# Patient Record
Sex: FEMALE | Race: WHITE | NOT HISPANIC OR LATINO
[De-identification: names, ages, dates, MRNs, and addresses within clinical notes are randomized per-mention and may not be internally consistent; named-entity substitution may affect disease eponyms.]

---

## 2017-02-08 ENCOUNTER — APPOINTMENT (OUTPATIENT)
Dept: ORTHOPEDIC SURGERY | Facility: CLINIC | Age: 67
End: 2017-02-08

## 2017-02-08 VITALS
DIASTOLIC BLOOD PRESSURE: 73 MMHG | WEIGHT: 250 LBS | HEIGHT: 68.5 IN | HEART RATE: 73 BPM | SYSTOLIC BLOOD PRESSURE: 119 MMHG | BODY MASS INDEX: 37.46 KG/M2

## 2017-02-08 DIAGNOSIS — M25.552 PAIN IN LEFT HIP: ICD-10-CM

## 2017-02-08 RX ORDER — GABAPENTIN 300 MG/1
300 CAPSULE ORAL
Qty: 30 | Refills: 2 | Status: DISCONTINUED | COMMUNITY
Start: 2017-02-08 | End: 2017-02-08

## 2017-02-08 RX ORDER — METHYLPREDNISOLONE 4 MG/1
4 TABLET ORAL
Qty: 21 | Refills: 0 | Status: DISCONTINUED | COMMUNITY
Start: 2017-02-08 | End: 2017-02-08

## 2017-02-12 ENCOUNTER — RESULT REVIEW (OUTPATIENT)
Age: 67
End: 2017-02-12

## 2017-02-15 PROBLEM — M25.552 LEFT HIP PAIN: Status: ACTIVE | Noted: 2017-02-15

## 2017-03-08 ENCOUNTER — APPOINTMENT (OUTPATIENT)
Dept: ORTHOPEDIC SURGERY | Facility: CLINIC | Age: 67
End: 2017-03-08

## 2017-03-08 VITALS
SYSTOLIC BLOOD PRESSURE: 132 MMHG | DIASTOLIC BLOOD PRESSURE: 84 MMHG | BODY MASS INDEX: 37.01 KG/M2 | HEART RATE: 77 BPM | WEIGHT: 247 LBS | HEIGHT: 68.5 IN

## 2017-03-08 RX ORDER — METHYLPREDNISOLONE 4 MG/1
4 TABLET ORAL
Qty: 21 | Refills: 0 | Status: DISCONTINUED | COMMUNITY
Start: 2017-02-08 | End: 2017-03-08

## 2017-03-10 NOTE — PHYSICAL EXAM
[Obese] : obese [Stooped] : stooped [Antalgic] : antalgic [Cane] : ambulates with cane [Limited] : is limited [Painful] : is painful [SLR] : positive straight leg raise [LE] : Sensory: Intact in bilateral lower extremities [0] : left ankle jerk 0 [DP] : dorsalis pedis 2+ and symmetric bilaterally [PT] : posterior tibial 2+ and symmetric bilaterally [Plantar Reflex Right Only] : absent on the right [Plantar Reflex Left Only] : absent on the left [DTR Reflexes Clonus Of Right Ankle (___ Beats)] : absent on the right [DTR Reflexes Clonus Of Left Ankle (___ Beats)] : absent on the left [FreeTextEntry2] : The pt is awake, alert and oriented to self, place and time, is uncomfortable but in no acute distress. Gait examination reveals a narrow based gait. Can heel and toe walk with difficulty Especially with toe walking on the left side. Inspection of neck, back and lower extremities bilaterally reveals no rashes or ecchymotic lesions.  There is no obvious abnormal spinal curvature in the sagittal and coronal planes. There is no tenderness over the cervical, thoracic spine, or the upper and lower extremities musculature. Left paraspinal as well as gluteal and sciatic notch tenderness noted. There is no sacroiliac tenderness. No greater trochanteric tenderness bilaterally. No atrophy or abnormal movements noted in the upper or lower extremities. There is no swelling noted in the upper or lower extremities bilaterally. No cervical lymphadenopathy noted anteriorly. No joint laxity noted in the upper and lower extremity joints bilaterally.\par  Hip range of motion is degrees internal rotation 30° external rotation without pain. Full range of motion of the shoulders bilaterally with no significant pain\par There is no groin pain with hip internal rotation and a negative ELIOT test bilaterally.  [de-identified] : flexion to her knees, extension 10 degrees with left calf pain [de-identified] : sits changing positions Because of discomfort [de-identified] : 4 views of the lumbar spine obtained today demonstrate vascular clips in the pelvis. The left-sided lumbar curve is noted. On the lateral projection spondylolisthesis at L4-5 and to a lesser extent L3-4. Between flexion extension no significant dynamic instability is noted. No obvious acute fractures identified. Minimal loss of disc height is also appreciated at the L5-S1 level. The disc heights appear preserved at the L3-4 and L4-5 levels.\par \par AP pelvis demonstrates vascular clips in the pelvis suggest a limb length discrepancy the left leg is shorter than the right. No significant changes in the hips bilaterally

## 2017-03-10 NOTE — CONSULT LETTER
[Dear  ___] : Dear  [unfilled], [I had the pleasure of evaluating your patient, [unfilled].] : I had the pleasure of evaluating your patient, [unfilled]. [FreeTextEntry2] : Hebert Poon [FreeTextEntry1] : Thank you for this referral. I have enclosed my note for your review. Please feel free to contact my office if you have additional questions regarding this patient.\par \par Regards,\par Krishan Ramirez MD, FACS, FAAOS\par \par  of Orthopaedic Surgery\par [facility] [employer]\par Spinal Reconstruction Surgery\par Minimally Invasive Spinal Surgery\par [facility]

## 2017-03-10 NOTE — HISTORY OF PRESENT ILLNESS
[Joint Pain] : joint pain [Joint Stiffness] : joint stiffness [Joint Swelling] : joint swelling [Muscle Aches] : muscle aches [Pain] : pain [Worsening] : worsening [10] : currently ~his/her~ pain is 10 out of 10 [Sitting] : sitting [Standing] : standing [Constant] : ~He/She~ states the symptoms seem to be constant [All Other ROS Normal] : All other review of systems are negative except as noted [All Hx] : past medical, family, and social [All] : medication and allergy [Bending] : worsened by bending [Lifting] : worsened by lifting [Prolonged Sitting] : worsened by prolonged sitting [Prolonged Standing] : worsened by prolonged standing [Walking] : worsened by walking [FreeTextEntry1] : Left posterior thigh pain WC DOI 2/7/16. 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  [FreeTextEntry2] : Pt presents here today for evaluation of left posterior thigh pain following work related injury 2/1/17 and 2/7/17. Pt states on 2/1/17 felt twinge of pain to left groin and left buttock following lifting a patient. Pt states pain progressively worsened and she followed up with PCP on 2/6/17 and GYN secondary to ovarian cancer in the past with hysterectomy for sarcoma.\par Pt states pain progressively worsened and was exacerbated on 2/7/17 when she was assisting another patient. Pain got worse from buttock to posterior knee. Pt seen in [facility] and was given injection of anti-inflammatory (toradal)  with no relief. Pt states also prescribed cyclobenzaprine which she is taking with no relief. Pt reports pain 10/10 in intensity and is constant, occurs to left groin, left buttock, and lateral left thigh.  [de-identified] : laying down [de-identified] : celebrex

## 2017-03-10 NOTE — DISCUSSION/SUMMARY
[Medication Risks Reviewed] : Medication risks reviewed [de-identified] : The patient today presents with symptoms suggestive and consistent with the diagnosis of lumbar radiculitis. She has spinal listhesis L3-4 and L4-5 as well as some loss of disc at L5-S1. Given her groin pain posterior thigh and buttock pain and L5, S1 radiculopathy cannot be excluded. I recommended an MRI lumbar spine for further evaluation. I also recommended reevaluation in 2 weeks. Note restricting her from work for 2 weeks which provided.Also recommended a lumbosacral orthosis for her.A trial of oral steroids and gabapentin was provided. I've instructed her to stop taking Celebrex until the steroids are completed.\par \par The patient was educated regarding their condition, treatment options as well as prescribed course of treatment. \par Risks and benefits as well as alternatives to the proposed treatment were also provided to the patient \par They were given the opportunity to have all their questions answered to their satisfaction.\par \par Vital signs were reviewed with the patient and the patient was instructed to followup with their primary care provider for further management.\par \par Healthy lifestyle recommendations were also made including a tobacco free lifestyle, proper diet, and weight control.

## 2017-03-26 ENCOUNTER — RESULT REVIEW (OUTPATIENT)
Age: 67
End: 2017-03-26

## 2017-04-03 ENCOUNTER — APPOINTMENT (OUTPATIENT)
Dept: ORTHOPEDIC SURGERY | Facility: CLINIC | Age: 67
End: 2017-04-03

## 2017-04-03 VITALS
BODY MASS INDEX: 38.65 KG/M2 | HEIGHT: 68.5 IN | SYSTOLIC BLOOD PRESSURE: 124 MMHG | HEART RATE: 90 BPM | WEIGHT: 258 LBS | DIASTOLIC BLOOD PRESSURE: 79 MMHG

## 2017-04-24 ENCOUNTER — APPOINTMENT (OUTPATIENT)
Dept: ORTHOPEDIC SURGERY | Facility: CLINIC | Age: 67
End: 2017-04-24

## 2017-04-24 VITALS
HEIGHT: 68.5 IN | WEIGHT: 258 LBS | DIASTOLIC BLOOD PRESSURE: 78 MMHG | BODY MASS INDEX: 38.65 KG/M2 | SYSTOLIC BLOOD PRESSURE: 116 MMHG | HEART RATE: 87 BPM

## 2017-04-27 ENCOUNTER — APPOINTMENT (OUTPATIENT)
Dept: MRI IMAGING | Facility: IMAGING CENTER | Age: 67
End: 2017-04-27

## 2017-04-27 ENCOUNTER — OUTPATIENT (OUTPATIENT)
Dept: OUTPATIENT SERVICES | Facility: HOSPITAL | Age: 67
LOS: 1 days | End: 2017-04-27
Payer: COMMERCIAL

## 2017-04-27 DIAGNOSIS — M43.16 SPONDYLOLISTHESIS, LUMBAR REGION: ICD-10-CM

## 2017-04-27 DIAGNOSIS — M54.16 RADICULOPATHY, LUMBAR REGION: ICD-10-CM

## 2017-04-27 DIAGNOSIS — M51.37 OTHER INTERVERTEBRAL DISC DEGENERATION, LUMBOSACRAL REGION: ICD-10-CM

## 2017-04-27 PROCEDURE — 72148 MRI LUMBAR SPINE W/O DYE: CPT

## 2017-05-01 ENCOUNTER — APPOINTMENT (OUTPATIENT)
Dept: ORTHOPEDIC SURGERY | Facility: CLINIC | Age: 67
End: 2017-05-01

## 2017-05-01 VITALS
HEART RATE: 87 BPM | HEIGHT: 68.5 IN | BODY MASS INDEX: 36.71 KG/M2 | DIASTOLIC BLOOD PRESSURE: 80 MMHG | WEIGHT: 245 LBS | SYSTOLIC BLOOD PRESSURE: 127 MMHG

## 2017-05-01 DIAGNOSIS — M71.38 OTHER BURSAL CYST, OTHER SITE: ICD-10-CM

## 2017-05-03 ENCOUNTER — APPOINTMENT (OUTPATIENT)
Dept: MRI IMAGING | Facility: CLINIC | Age: 67
End: 2017-05-03

## 2017-05-03 ENCOUNTER — RESULT REVIEW (OUTPATIENT)
Age: 67
End: 2017-05-03

## 2017-05-03 DIAGNOSIS — Z98.1 ARTHRODESIS STATUS: ICD-10-CM

## 2017-05-11 ENCOUNTER — CHART COPY (OUTPATIENT)
Age: 67
End: 2017-05-11

## 2017-05-15 ENCOUNTER — APPOINTMENT (OUTPATIENT)
Dept: ORTHOPEDIC SURGERY | Facility: CLINIC | Age: 67
End: 2017-05-15

## 2017-05-22 ENCOUNTER — APPOINTMENT (OUTPATIENT)
Dept: SPINE | Facility: CLINIC | Age: 67
End: 2017-05-22

## 2017-05-22 VITALS
DIASTOLIC BLOOD PRESSURE: 72 MMHG | SYSTOLIC BLOOD PRESSURE: 108 MMHG | HEIGHT: 68.5 IN | WEIGHT: 246 LBS | HEART RATE: 76 BPM | BODY MASS INDEX: 36.85 KG/M2

## 2017-05-22 DIAGNOSIS — M51.37 OTHER INTERVERTEBRAL DISC DEGENERATION, LUMBOSACRAL REGION: ICD-10-CM

## 2017-05-22 RX ORDER — AMOXICILLIN AND CLAVULANATE POTASSIUM 875; 125 MG/1; MG/1
875-125 TABLET, COATED ORAL
Qty: 14 | Refills: 0 | Status: COMPLETED | COMMUNITY
Start: 2017-02-24 | End: 2017-05-22

## 2017-05-22 RX ORDER — CYCLOBENZAPRINE HYDROCHLORIDE 10 MG/1
10 TABLET, FILM COATED ORAL
Qty: 10 | Refills: 0 | Status: COMPLETED | COMMUNITY
Start: 2017-02-07 | End: 2017-05-22

## 2017-05-22 RX ORDER — METOPROLOL SUCCINATE 50 MG/1
50 TABLET, EXTENDED RELEASE ORAL
Qty: 90 | Refills: 0 | Status: COMPLETED | COMMUNITY
Start: 2016-09-12 | End: 2017-05-22

## 2017-05-22 RX ORDER — RANOLAZINE 1000 MG/1
1000 TABLET, FILM COATED, EXTENDED RELEASE ORAL
Refills: 0 | Status: ACTIVE | COMMUNITY

## 2017-05-22 RX ORDER — GABAPENTIN 300 MG/1
300 CAPSULE ORAL
Qty: 30 | Refills: 2 | Status: COMPLETED | COMMUNITY
Start: 2017-02-08 | End: 2017-05-22

## 2017-05-22 RX ORDER — CLINDAMYCIN PHOSPHATE 20 MG/G
2 CREAM VAGINAL
Qty: 40 | Refills: 0 | Status: COMPLETED | COMMUNITY
Start: 2017-03-06 | End: 2017-05-22

## 2017-06-19 ENCOUNTER — APPOINTMENT (OUTPATIENT)
Dept: SPINE | Facility: CLINIC | Age: 67
End: 2017-06-19

## 2017-06-19 VITALS
WEIGHT: 247 LBS | DIASTOLIC BLOOD PRESSURE: 83 MMHG | HEART RATE: 71 BPM | BODY MASS INDEX: 37.44 KG/M2 | SYSTOLIC BLOOD PRESSURE: 132 MMHG | HEIGHT: 68 IN

## 2017-06-19 DIAGNOSIS — M51.26 OTHER INTERVERTEBRAL DISC DISPLACEMENT, LUMBAR REGION: ICD-10-CM

## 2017-07-03 ENCOUNTER — APPOINTMENT (OUTPATIENT)
Dept: ORTHOPEDIC SURGERY | Facility: CLINIC | Age: 67
End: 2017-07-03

## 2017-07-03 DIAGNOSIS — M67.442 GANGLION, LEFT HAND: ICD-10-CM

## 2017-07-03 DIAGNOSIS — S63.636D SPRAIN OF INTERPHALANGEAL JOINT OF RIGHT LITTLE FINGER, SUBSEQUENT ENCOUNTER: ICD-10-CM

## 2017-07-04 PROBLEM — S63.636D: Status: ACTIVE | Noted: 2017-07-03

## 2017-07-17 ENCOUNTER — APPOINTMENT (OUTPATIENT)
Dept: ORTHOPEDIC SURGERY | Facility: CLINIC | Age: 67
End: 2017-07-17

## 2017-07-17 ENCOUNTER — APPOINTMENT (OUTPATIENT)
Dept: SPINE | Facility: CLINIC | Age: 67
End: 2017-07-17

## 2017-07-17 VITALS
SYSTOLIC BLOOD PRESSURE: 133 MMHG | HEART RATE: 90 BPM | BODY MASS INDEX: 37.59 KG/M2 | WEIGHT: 248 LBS | DIASTOLIC BLOOD PRESSURE: 83 MMHG | HEIGHT: 68 IN

## 2017-07-17 DIAGNOSIS — M54.16 RADICULOPATHY, LUMBAR REGION: ICD-10-CM

## 2017-08-08 ENCOUNTER — APPOINTMENT (OUTPATIENT)
Dept: GASTROENTEROLOGY | Facility: CLINIC | Age: 67
End: 2017-08-08
Payer: COMMERCIAL

## 2017-08-08 PROCEDURE — 99244 OFF/OP CNSLTJ NEW/EST MOD 40: CPT | Mod: 25

## 2017-08-08 PROCEDURE — 36415 COLL VENOUS BLD VENIPUNCTURE: CPT

## 2017-08-08 PROCEDURE — 82274 ASSAY TEST FOR BLOOD FECAL: CPT | Mod: QW

## 2017-08-23 ENCOUNTER — RESULT REVIEW (OUTPATIENT)
Age: 67
End: 2017-08-23

## 2017-08-23 ENCOUNTER — APPOINTMENT (OUTPATIENT)
Dept: GASTROENTEROLOGY | Facility: CLINIC | Age: 67
End: 2017-08-23
Payer: COMMERCIAL

## 2017-08-23 PROCEDURE — 45380 COLONOSCOPY AND BIOPSY: CPT

## 2017-08-23 PROCEDURE — 43239 EGD BIOPSY SINGLE/MULTIPLE: CPT | Mod: 59

## 2017-09-07 ENCOUNTER — APPOINTMENT (OUTPATIENT)
Dept: GASTROENTEROLOGY | Facility: CLINIC | Age: 67
End: 2017-09-07
Payer: COMMERCIAL

## 2017-09-07 PROCEDURE — 99214 OFFICE O/P EST MOD 30 MIN: CPT

## 2017-10-23 ENCOUNTER — APPOINTMENT (OUTPATIENT)
Dept: ORTHOPEDIC SURGERY | Facility: CLINIC | Age: 67
End: 2017-10-23

## 2018-01-30 ENCOUNTER — APPOINTMENT (OUTPATIENT)
Dept: ORTHOPEDIC SURGERY | Facility: CLINIC | Age: 68
End: 2018-01-30
Payer: MEDICARE

## 2018-01-30 DIAGNOSIS — M19.042 PRIMARY OSTEOARTHRITIS, LEFT HAND: ICD-10-CM

## 2018-01-30 DIAGNOSIS — M65.351 TRIGGER FINGER, RIGHT LITTLE FINGER: ICD-10-CM

## 2018-01-30 PROCEDURE — 99214 OFFICE O/P EST MOD 30 MIN: CPT | Mod: 25

## 2018-01-30 PROCEDURE — 20550 NJX 1 TENDON SHEATH/LIGAMENT: CPT | Mod: RT

## 2018-03-14 ENCOUNTER — APPOINTMENT (OUTPATIENT)
Dept: ORTHOPEDIC SURGERY | Facility: CLINIC | Age: 68
End: 2018-03-14
Payer: MEDICARE

## 2018-03-14 VITALS
DIASTOLIC BLOOD PRESSURE: 77 MMHG | BODY MASS INDEX: 37.59 KG/M2 | SYSTOLIC BLOOD PRESSURE: 117 MMHG | WEIGHT: 248 LBS | HEIGHT: 68 IN | HEART RATE: 83 BPM

## 2018-03-14 PROCEDURE — 73502 X-RAY EXAM HIP UNI 2-3 VIEWS: CPT | Mod: RT

## 2018-03-14 PROCEDURE — 99214 OFFICE O/P EST MOD 30 MIN: CPT

## 2018-03-14 PROCEDURE — 72100 X-RAY EXAM L-S SPINE 2/3 VWS: CPT

## 2018-11-05 ENCOUNTER — APPOINTMENT (OUTPATIENT)
Dept: ORTHOPEDIC SURGERY | Facility: CLINIC | Age: 68
End: 2018-11-05
Payer: MEDICARE

## 2018-11-05 VITALS
HEART RATE: 74 BPM | BODY MASS INDEX: 38.49 KG/M2 | SYSTOLIC BLOOD PRESSURE: 135 MMHG | DIASTOLIC BLOOD PRESSURE: 86 MMHG | WEIGHT: 254 LBS | HEIGHT: 68 IN

## 2018-11-05 PROCEDURE — 99214 OFFICE O/P EST MOD 30 MIN: CPT

## 2018-11-05 PROCEDURE — 73502 X-RAY EXAM HIP UNI 2-3 VIEWS: CPT | Mod: RT

## 2018-11-05 RX ORDER — GABAPENTIN 300 MG/1
300 CAPSULE ORAL 3 TIMES DAILY
Qty: 90 | Refills: 2 | Status: DISCONTINUED | COMMUNITY
Start: 2017-05-01 | End: 2018-11-05

## 2018-11-26 ENCOUNTER — APPOINTMENT (OUTPATIENT)
Dept: ULTRASOUND IMAGING | Facility: CLINIC | Age: 68
End: 2018-11-26
Payer: MEDICARE

## 2018-11-26 ENCOUNTER — APPOINTMENT (OUTPATIENT)
Dept: MRI IMAGING | Facility: CLINIC | Age: 68
End: 2018-11-26
Payer: MEDICARE

## 2018-11-26 ENCOUNTER — OUTPATIENT (OUTPATIENT)
Dept: OUTPATIENT SERVICES | Facility: HOSPITAL | Age: 68
LOS: 1 days | End: 2018-11-26
Payer: MEDICARE

## 2018-11-26 DIAGNOSIS — Z00.8 ENCOUNTER FOR OTHER GENERAL EXAMINATION: ICD-10-CM

## 2018-11-26 PROCEDURE — 73721 MRI JNT OF LWR EXTRE W/O DYE: CPT | Mod: 26,RT

## 2018-11-26 PROCEDURE — 72148 MRI LUMBAR SPINE W/O DYE: CPT | Mod: 26

## 2018-11-26 PROCEDURE — 20611 DRAIN/INJ JOINT/BURSA W/US: CPT

## 2018-11-26 PROCEDURE — 20611 DRAIN/INJ JOINT/BURSA W/US: CPT | Mod: RT

## 2018-11-26 PROCEDURE — 73721 MRI JNT OF LWR EXTRE W/O DYE: CPT

## 2018-11-26 PROCEDURE — 72148 MRI LUMBAR SPINE W/O DYE: CPT

## 2018-12-04 ENCOUNTER — APPOINTMENT (OUTPATIENT)
Dept: ORTHOPEDIC SURGERY | Facility: CLINIC | Age: 68
End: 2018-12-04
Payer: MEDICARE

## 2018-12-04 VITALS
HEIGHT: 68 IN | HEART RATE: 73 BPM | BODY MASS INDEX: 38.49 KG/M2 | SYSTOLIC BLOOD PRESSURE: 114 MMHG | WEIGHT: 254 LBS | DIASTOLIC BLOOD PRESSURE: 61 MMHG

## 2018-12-04 DIAGNOSIS — M43.16 SPONDYLOLISTHESIS, LUMBAR REGION: ICD-10-CM

## 2018-12-04 DIAGNOSIS — M70.61 TROCHANTERIC BURSITIS, RIGHT HIP: ICD-10-CM

## 2018-12-04 PROCEDURE — 99214 OFFICE O/P EST MOD 30 MIN: CPT

## 2018-12-08 PROBLEM — M70.61 GREATER TROCHANTERIC BURSITIS OF RIGHT HIP: Status: ACTIVE | Noted: 2018-03-14

## 2018-12-08 PROBLEM — M43.16 SPONDYLOLISTHESIS OF LUMBAR REGION: Status: ACTIVE | Noted: 2017-02-08

## 2019-01-11 ENCOUNTER — OUTPATIENT (OUTPATIENT)
Dept: OUTPATIENT SERVICES | Facility: HOSPITAL | Age: 69
LOS: 1 days | Discharge: HOME | End: 2019-01-11

## 2019-01-11 ENCOUNTER — APPOINTMENT (OUTPATIENT)
Dept: SPEECH THERAPY | Facility: CLINIC | Age: 69
End: 2019-01-11

## 2019-01-24 DIAGNOSIS — R49.0 DYSPHONIA: ICD-10-CM

## 2019-01-25 ENCOUNTER — APPOINTMENT (OUTPATIENT)
Dept: SPEECH THERAPY | Facility: CLINIC | Age: 69
End: 2019-01-25

## 2019-02-06 ENCOUNTER — APPOINTMENT (OUTPATIENT)
Dept: SPEECH THERAPY | Facility: CLINIC | Age: 69
End: 2019-02-06

## 2019-02-12 ENCOUNTER — APPOINTMENT (OUTPATIENT)
Dept: ORTHOPEDIC SURGERY | Facility: CLINIC | Age: 69
End: 2019-02-12
Payer: MEDICARE

## 2019-02-12 VITALS — SYSTOLIC BLOOD PRESSURE: 106 MMHG | HEART RATE: 76 BPM | DIASTOLIC BLOOD PRESSURE: 70 MMHG

## 2019-02-12 DIAGNOSIS — M47.817 SPONDYLOSIS W/OUT MYELOPATHY OR RADICULOPATHY, LUMBOSACRAL REGION: ICD-10-CM

## 2019-02-12 DIAGNOSIS — M70.62 TROCHANTERIC BURSITIS, LEFT HIP: ICD-10-CM

## 2019-02-12 PROCEDURE — 99213 OFFICE O/P EST LOW 20 MIN: CPT

## 2019-02-15 ENCOUNTER — APPOINTMENT (OUTPATIENT)
Dept: ORTHOPEDIC SURGERY | Facility: CLINIC | Age: 69
End: 2019-02-15

## 2019-02-15 ENCOUNTER — APPOINTMENT (OUTPATIENT)
Dept: SPEECH THERAPY | Facility: CLINIC | Age: 69
End: 2019-02-15

## 2019-02-15 PROBLEM — M70.62 TROCHANTERIC BURSITIS OF LEFT HIP: Status: ACTIVE | Noted: 2017-04-24

## 2019-02-15 PROBLEM — M47.817 SPONDYLOSIS, LUMBOSACRAL: Status: ACTIVE | Noted: 2018-11-06

## 2019-02-15 NOTE — PHYSICAL EXAM
[ALL] : dorsalis pedis, posterior tibial, femoral, popliteal, and radial 2+ and symmetric bilaterally [Normal] : Oriented to person, place, and time, insight and judgement were intact and the affect was normal [UE/LE] : Sensory: Intact in bilateral upper & lower extremities [de-identified] : Constitutional: Well-nourished, well-developed, No acute distress\par Respiratory:  Good respiratory effort, no SOB\par Lymphatic: No regional lymphadenopathy, no lymphedema\par Psychiatric: Pleasant and normal affect, alert and oriented x3\par Skin: Clean dry and intact B/L UE/LE\par Musculoskeletal: normal except where as noted in regional exam\par \par Lumbar Spine Exam\par \par APPEARANCE: no marked deformities or malalignment, + loss of lordotic curvature of the lumbosacral spine. no marked deformities. + poor posture\par POSITIVE TENDERNESS: + IL/SI/ST ligs, + TTP of b/l SI joints, + b/l lower lumbar tenderness \par NONTENDER: no bony midline tenderness.\par ROM: Mildly limited in all directions, mildly painful at end range of flexion and extension\par SPECIAL TESTS: neg SLR b/l, neg ELIOT b/l\par PULSES: 2+ DP/PT pulses\par NEURO: \par decreased sensation lateral aspect bilateral thighs\par decreased sensation lateral aspect left leg and lateral aspect right foot\par + weakness right hip abduction (3- to 3/5), bilateral knee extension and flexion (3+ to 4-/5), and bilateral straight leg raise (3+ to 4-/5)\par \par Right Hip:\par \par APPEARANCE: no marked deformities, no swelling or malalignment\par POSITIVE TENDERNESS: residual Greater trochanter ttp, and mild distally along ITB\par NONTENDER: TFL, gluteal region, ischium/proximal hamstring region, hip flexor region, sartorius and pubic symphysis. \par ROM: full & painless. \par RESISTIVE TESTING: +pain, weakness in lateral hip with resisted abduction\par PULSES: 2+ DP/PT pulses\par \par  [Poor Appearance] : well-appearing [Acute Distress] : not in acute distress

## 2019-02-15 NOTE — HISTORY OF PRESENT ILLNESS
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 can reach up to 9/10 at times\par at end of last visit, advised patient to proceed with previously ordered EMG-NCV testing BLE\par still hasn't gone to have this testing done\par also advised to f/up with Dr Ramirez for further management of her underlying lumbar spine pathology/etiology\par hasn't yet seen Dr Ramirez for f/up \par also referred to see a hip subpecialist who performs hip arthroscopy (as patient not a candidate for ARIEL at this time)\par hasn't yet gone to see recommended hip arthroscopy specialist \par

## 2019-02-15 NOTE — DISCUSSION/SUMMARY
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 also with clinically resolving/improving right hip greater troch bursitis. MRI right hip + for underlying gluteal tendinopathy but negative advanced/significant hip abductor tears or arthropathy\par continue current nonop tx as already in place\par have yet again advised patient to proceed with previously ordered BLE EMG-NCV testing \par patient once again advised to f/up witH Dr Ramirez regarding her lumbar spine pathology (as I still believe that this is the main source of her symptoms/complaints/findings)\par otherwise patient once again advised to see a hip subspecialist who performs hip arthroscopy to see if she could be a candidate for that type of procedure to treat her posterolateral hip pain complaints (as she is not a candidate currently for ARIEL surgery)\par f/up with me prn

## 2019-02-21 ENCOUNTER — APPOINTMENT (OUTPATIENT)
Dept: ORTHOPEDIC SURGERY | Facility: CLINIC | Age: 69
End: 2019-02-21
Payer: MEDICARE

## 2019-02-21 VITALS
DIASTOLIC BLOOD PRESSURE: 81 MMHG | SYSTOLIC BLOOD PRESSURE: 133 MMHG | BODY MASS INDEX: 37.89 KG/M2 | HEART RATE: 98 BPM | WEIGHT: 250 LBS | HEIGHT: 68 IN

## 2019-02-21 DIAGNOSIS — M25.551 PAIN IN RIGHT HIP: ICD-10-CM

## 2019-02-21 DIAGNOSIS — G89.29 PAIN IN RIGHT HIP: ICD-10-CM

## 2019-02-21 DIAGNOSIS — M25.561 PAIN IN RIGHT KNEE: ICD-10-CM

## 2019-02-21 DIAGNOSIS — M25.461 EFFUSION, RIGHT KNEE: ICD-10-CM

## 2019-02-21 PROCEDURE — 99214 OFFICE O/P EST MOD 30 MIN: CPT | Mod: 25

## 2019-02-21 PROCEDURE — 20610 DRAIN/INJ JOINT/BURSA W/O US: CPT | Mod: RT

## 2019-03-05 ENCOUNTER — LABORATORY RESULT (OUTPATIENT)
Age: 69
End: 2019-03-05

## 2019-03-06 ENCOUNTER — APPOINTMENT (OUTPATIENT)
Dept: GASTROENTEROLOGY | Facility: CLINIC | Age: 69
End: 2019-03-06
Payer: MEDICARE

## 2019-03-06 VITALS
SYSTOLIC BLOOD PRESSURE: 133 MMHG | WEIGHT: 250 LBS | HEART RATE: 81 BPM | DIASTOLIC BLOOD PRESSURE: 94 MMHG | BODY MASS INDEX: 37.46 KG/M2 | HEIGHT: 68.5 IN

## 2019-03-06 DIAGNOSIS — K21.9 GASTRO-ESOPHAGEAL REFLUX DISEASE W/OUT ESOPHAGITIS: ICD-10-CM

## 2019-03-06 DIAGNOSIS — E06.3 AUTOIMMUNE THYROIDITIS: ICD-10-CM

## 2019-03-06 DIAGNOSIS — E78.5 HYPERLIPIDEMIA, UNSPECIFIED: ICD-10-CM

## 2019-03-06 DIAGNOSIS — Z83.71 FAMILY HISTORY OF COLONIC POLYPS: ICD-10-CM

## 2019-03-06 DIAGNOSIS — E66.9 OBESITY, UNSPECIFIED: ICD-10-CM

## 2019-03-06 DIAGNOSIS — K44.9 DIAPHRAGMATIC HERNIA W/OUT OBSTRUCTION OR GANGRENE: ICD-10-CM

## 2019-03-06 DIAGNOSIS — Z83.79 FAMILY HISTORY OF OTHER DISEASES OF THE DIGESTIVE SYSTEM: ICD-10-CM

## 2019-03-06 DIAGNOSIS — G47.33 OBSTRUCTIVE SLEEP APNEA (ADULT) (PEDIATRIC): ICD-10-CM

## 2019-03-06 PROCEDURE — 82274 ASSAY TEST FOR BLOOD FECAL: CPT | Mod: QW

## 2019-03-06 PROCEDURE — 36415 COLL VENOUS BLD VENIPUNCTURE: CPT

## 2019-03-06 PROCEDURE — 99215 OFFICE O/P EST HI 40 MIN: CPT | Mod: 25

## 2019-03-06 NOTE — REASON FOR VISIT
[Follow-Up: _____] : a [unfilled] follow-up visit [FreeTextEntry1] : Pt c/o coughing, "burping up acid taste and mucus" x several months.  Pt saw ENT who Rx Zantac.

## 2019-03-06 NOTE — REVIEW OF SYSTEMS
[Feeling Tired] : feeling tired [Sore Throat] : sore throat [Hoarseness] : hoarseness [Palpitations] : palpitations [Leg Claudication] : intermittent leg claudication [Shortness Of Breath] : shortness of breath [Cough] : cough [SOB on Exertion] : shortness of breath during exertion [Joint Pain] : joint pain [Joint Swelling] : joint swelling [Joint Stiffness] : joint stiffness [Difficulty Walking] : difficulty walking [Muscle Weakness] : muscle weakness [Deepening Of The Voice] : deepening of the voice [Feelings Of Weakness] : feelings of weakness [Easy Bruising] : a tendency for easy bruising [Negative] : Heme/Lymph [As Noted in HPI] : as noted in HPI

## 2019-03-06 NOTE — CONSULT LETTER
[Dear  ___] : Dear  [unfilled], [Courtesy Letter:] : I had the pleasure of seeing your patient, [unfilled], in my office today. [Please see my note below.] : Please see my note below. [Consult Closing:] : Thank you very much for allowing me to participate in the care of this patient.  If you have any questions, please do not hesitate to contact me. [Sincerely,] : Sincerely, [DrSusan  ___] : Dr. BAE [FreeTextEntry3] : Nba Hawley M.D.\par

## 2019-03-06 NOTE — PHYSICAL EXAM
[General Appearance - Alert] : alert [General Appearance - In No Acute Distress] : in no acute distress [General Appearance - Well Nourished] : well nourished [General Appearance - Well Developed] : well developed [Sclera] : the sclera and conjunctiva were normal [Outer Ear] : the ears and nose were normal in appearance [Oropharynx] : the oropharynx was normal [Neck Appearance] : the appearance of the neck was normal [Neck Cervical Mass (___cm)] : no neck mass was observed [Jugular Venous Distention Increased] : there was no jugular-venous distention [Thyroid Diffuse Enlargement] : the thyroid was not enlarged [Thyroid Nodule] : there were no palpable thyroid nodules [Auscultation Breath Sounds / Voice Sounds] : lungs were clear to auscultation bilaterally [Heart Rate And Rhythm] : heart rate was normal and rhythm regular [Heart Sounds] : normal S1 and S2 [Heart Sounds Gallop] : no gallops [Murmurs] : no murmurs [Heart Sounds Pericardial Friction Rub] : no pericardial rub [Full Pulse] : the pedal pulses are present [Edema] : there was no peripheral edema [Bowel Sounds] : normal bowel sounds [Abdomen Soft] : soft [Abdomen Tenderness] : non-tender [Abdomen Mass (___ Cm)] : no abdominal mass palpated [Normal Sphincter Tone] : normal sphincter tone [No Rectal Mass] : no rectal mass [Internal Hemorrhoid] : internal hemorrhoids [Cervical Lymph Nodes Enlarged Posterior Bilaterally] : posterior cervical [Cervical Lymph Nodes Enlarged Anterior Bilaterally] : anterior cervical [Supraclavicular Lymph Nodes Enlarged Bilaterally] : supraclavicular [Axillary Lymph Nodes Enlarged Bilaterally] : axillary [Femoral Lymph Nodes Enlarged Bilaterally] : femoral [Inguinal Lymph Nodes Enlarged Bilaterally] : inguinal [No CVA Tenderness] : no ~M costovertebral angle tenderness [No Spinal Tenderness] : no spinal tenderness [Abnormal Walk] : normal gait [Nail Clubbing] : no clubbing  or cyanosis of the fingernails [Musculoskeletal - Swelling] : no joint swelling seen [Motor Tone] : muscle strength and tone were normal [Skin Color & Pigmentation] : normal skin color and pigmentation [Skin Turgor] : normal skin turgor [] : no rash [Oriented To Time, Place, And Person] : oriented to person, place, and time [Impaired Insight] : insight and judgment were intact [Affect] : the affect was normal [External Hemorrhoid] : no external hemorrhoids [Occult Blood Positive] : stool was negative for occult blood [FreeTextEntry1] : FIT negative

## 2019-03-06 NOTE — HISTORY OF PRESENT ILLNESS
[FreeTextEntry1] : For several months, Josie has noted persistent productive cough with some burping and excessive mucus, worse on reclining. She was evaluated by ENT (Dr. Hakan Dent), with FEEST 12/3/18 again compatible with laryngopharyngeal reflux. She was started on Zantac 150 mg b.i.d., with incomplete relief. In 2017, when she had similar symptoms, similar symptoms responded to omeprazole 40 mg daily. She has been maintained on low-dose prednisone for quite some time.

## 2019-03-06 NOTE — ASSESSMENT
[FreeTextEntry1] : 1. Chronic GERD with laryngopharyngeal reflux, chronic cough; NSAID-induced gastroduodenal erosions, mild erosive esophagitis, hiatal hernia at repeat EGD August 2017.\par 2. Hemorrhoids at repeat colonoscopy August 2017; family history of colonic polyps (mother).\par 3. Obesity.\par 4. Status post total abdominal hysterectomy and radiation for endometrioid cancer.\par 5. Hashimoto's thyroiditis.\par 6. Status post cardiac ablation.\par 7. Sleep apnea.\par 8. Hyperlipidemia.\par 9. Chronic rheumatologic disorder.\par 10. Status post appendectomy, hernia surgeries, T&A, left elbow surgery.\par 11. Allergic to latex, glucosamine, mold, dust, perfume.\par \par Plan:\par 1. Bloodwork drawn by me this afternoon. She will call for test results early next week.\par 2. Take PPI, such as omeprazole 40 mg, daily at bedtime (or before dinner)--note potential interaction with thyroid medicine.\par 3. Dietary and lifestyle modifications reviewed. [employer] brochure on "GERD" given and discussed.\par 4. If symptoms continue despite taking omeprazole daily, can add H2 blocker in the mornings.\par 5. If symptoms continue to be problematic despite above, return here this spring.\par 6. No plans for repeat EGD at this time.

## 2019-03-11 LAB
ALBUMIN SERPL ELPH-MCNC: 4.3 G/DL
ALP BLD-CCNC: 89 U/L
ALT SERPL-CCNC: 20 U/L
AMYLASE/CREAT SERPL: 54 U/L
ANION GAP SERPL CALC-SCNC: 12 MMOL/L
AST SERPL-CCNC: 16 U/L
BASOPHILS # BLD AUTO: 0.03 K/UL
BASOPHILS NFR BLD AUTO: 0.3 %
BILIRUB DIRECT SERPL-MCNC: 0.1 MG/DL
BILIRUB SERPL-MCNC: 0.5 MG/DL
BUN SERPL-MCNC: 18 MG/DL
CALCIUM SERPL-MCNC: 9.7 MG/DL
CHLORIDE SERPL-SCNC: 102 MMOL/L
CHOLEST SERPL-MCNC: 220 MG/DL
CHOLEST/HDLC SERPL: 2.8 RATIO
CO2 SERPL-SCNC: 26 MMOL/L
CREAT SERPL-MCNC: 0.96 MG/DL
EOSINOPHIL # BLD AUTO: 0.07 K/UL
EOSINOPHIL NFR BLD AUTO: 0.7 %
ERYTHROCYTE [SEDIMENTATION RATE] IN BLOOD BY WESTERGREN METHOD: 35 MM/HR
FERRITIN SERPL-MCNC: 183 NG/ML
GGT SERPL-CCNC: 11 U/L
GLUCOSE SERPL-MCNC: 93 MG/DL
HBA1C MFR BLD HPLC: 5.3 %
HCT VFR BLD CALC: 43.4 %
HDLC SERPL-MCNC: 79 MG/DL
HGB BLD-MCNC: 13.6 G/DL
IMM GRANULOCYTES NFR BLD AUTO: 0.4 %
IRON SATN MFR SERPL: 38 %
IRON SERPL-MCNC: 109 UG/DL
LDLC SERPL CALC-MCNC: 115 MG/DL
LPL SERPL-CCNC: 16 U/L
LYMPHOCYTES # BLD AUTO: 1.2 K/UL
LYMPHOCYTES NFR BLD AUTO: 12.8 %
MAGNESIUM SERPL-MCNC: 1.9 MG/DL
MAN DIFF?: NORMAL
MCHC RBC-ENTMCNC: 29.8 PG
MCHC RBC-ENTMCNC: 31.3 GM/DL
MCV RBC AUTO: 95 FL
MONOCYTES # BLD AUTO: 0.81 K/UL
MONOCYTES NFR BLD AUTO: 8.6 %
NEUTROPHILS # BLD AUTO: 7.26 K/UL
NEUTROPHILS NFR BLD AUTO: 77.2 %
PHOSPHATE SERPL-MCNC: 3.9 MG/DL
PLATELET # BLD AUTO: 208 K/UL
POTASSIUM SERPL-SCNC: 4.6 MMOL/L
PROT SERPL-MCNC: 6.7 G/DL
RBC # BLD: 4.57 M/UL
RBC # FLD: 13.5 %
SODIUM SERPL-SCNC: 139 MMOL/L
T3 SERPL-MCNC: 113 NG/DL
T3RU NFR SERPL: 0.9 TBI
T4 FREE SERPL-MCNC: 2.1 NG/DL
T4 SERPL-MCNC: 10.5 UG/DL
TIBC SERPL-MCNC: 288 UG/DL
TRIGL SERPL-MCNC: 129 MG/DL
TSH SERPL-ACNC: 0.1 UIU/ML
UIBC SERPL-MCNC: 179 UG/DL
WBC # FLD AUTO: 9.41 K/UL

## 2019-09-30 ENCOUNTER — APPOINTMENT (OUTPATIENT)
Dept: ORTHOPEDIC SURGERY | Facility: CLINIC | Age: 69
End: 2019-09-30
Payer: MEDICARE

## 2019-09-30 VITALS
DIASTOLIC BLOOD PRESSURE: 72 MMHG | SYSTOLIC BLOOD PRESSURE: 120 MMHG | HEIGHT: 68 IN | HEART RATE: 82 BPM | BODY MASS INDEX: 36.53 KG/M2 | WEIGHT: 241 LBS

## 2019-09-30 DIAGNOSIS — M67.441 GANGLION, RIGHT HAND: ICD-10-CM

## 2019-09-30 DIAGNOSIS — M19.041 PRIMARY OSTEOARTHRITIS, RIGHT HAND: ICD-10-CM

## 2019-09-30 PROCEDURE — 20612 ASPIRATE/INJ GANGLION CYST: CPT | Mod: F5

## 2019-09-30 PROCEDURE — 99214 OFFICE O/P EST MOD 30 MIN: CPT | Mod: 25

## 2019-10-01 RX ORDER — LEVOTHYROXINE SODIUM 0.1 MG/1
100 TABLET ORAL
Qty: 90 | Refills: 0 | Status: ACTIVE | COMMUNITY
Start: 2019-03-27

## 2019-10-01 RX ORDER — AMOXICILLIN 500 MG/1
500 CAPSULE ORAL
Qty: 30 | Refills: 0 | Status: DISCONTINUED | COMMUNITY
Start: 2019-04-22

## 2019-10-01 RX ORDER — AMOXICILLIN 500 MG/1
500 TABLET, FILM COATED ORAL
Qty: 21 | Refills: 0 | Status: DISCONTINUED | COMMUNITY
Start: 2019-05-17

## 2019-10-03 ENCOUNTER — MESSAGE (OUTPATIENT)
Age: 69
End: 2019-10-03

## 2020-08-14 ENCOUNTER — APPOINTMENT (OUTPATIENT)
Dept: SURGERY | Facility: CLINIC | Age: 70
End: 2020-08-14
Payer: MEDICARE

## 2020-08-14 VITALS
BODY MASS INDEX: 37.46 KG/M2 | DIASTOLIC BLOOD PRESSURE: 80 MMHG | SYSTOLIC BLOOD PRESSURE: 127 MMHG | HEIGHT: 68.5 IN | OXYGEN SATURATION: 100 % | HEART RATE: 99 BPM | WEIGHT: 250 LBS | RESPIRATION RATE: 15 BRPM | TEMPERATURE: 97.9 F

## 2020-08-14 DIAGNOSIS — M62.08 SEPARATION OF MUSCLE (NONTRAUMATIC), OTHER SITE: ICD-10-CM

## 2020-08-14 PROCEDURE — 99203 OFFICE O/P NEW LOW 30 MIN: CPT

## 2020-08-14 RX ORDER — AZITHROMYCIN MONOHYDRATE 10 MG/ML
1 SOLUTION/ DROPS OPHTHALMIC
Qty: 25 | Refills: 0 | Status: DISCONTINUED | COMMUNITY
Start: 2019-03-29 | End: 2020-08-14

## 2020-08-14 RX ORDER — IBUPROFEN 200 MG/1
TABLET, COATED ORAL
Refills: 0 | Status: DISCONTINUED | COMMUNITY
End: 2020-08-14

## 2020-08-14 RX ORDER — RANITIDINE 150 MG/1
150 TABLET ORAL
Qty: 180 | Refills: 0 | Status: DISCONTINUED | COMMUNITY
Start: 2019-06-28 | End: 2020-08-14

## 2020-08-14 RX ORDER — RANITIDINE HCL 150 MG
CAPSULE ORAL
Refills: 0 | Status: DISCONTINUED | COMMUNITY
End: 2020-08-14

## 2020-08-14 RX ORDER — LEVOTHYROXINE SODIUM 0.11 MG/1
112 TABLET ORAL
Qty: 90 | Refills: 0 | Status: DISCONTINUED | COMMUNITY
Start: 2018-11-19 | End: 2020-08-14

## 2020-08-14 RX ORDER — PREDNISONE 2.5 MG/1
2.5 TABLET ORAL
Qty: 180 | Refills: 0 | Status: DISCONTINUED | COMMUNITY
Start: 2019-02-22 | End: 2020-08-14

## 2020-08-14 RX ORDER — PREDNISONE 5 MG/1
5 TABLET ORAL
Refills: 0 | Status: DISCONTINUED | COMMUNITY
End: 2020-08-14

## 2020-08-14 NOTE — CONSULT LETTER
[Dear  ___] : Dear ~VANDANA, [Sincerely,] : Sincerely, [FreeTextEntry2] : Dr. Woody Moran [FreeTextEntry1] : At your recommendation I saw Leyla Pemberton in the office on August 14th for evaluation of a possible abdominal wall hernia. As you know, her past history is significant for previous hysterectomy for uterine cancer as well as mesh repair of umbilical and, subsequently, epigastric hernias. Over the past year she has become aware of a diffuse bulge in the upper midline area of the abdomen and has also been experiencing some mild discomfort across the subcostal region. She stated that the bulge is most apparent when she sits up and she denied any other abdominal symptoms or change in bowel habits.\par \par On exam, patient was noted to be somewhat overweight. The abdomen was soft, nondistended and nontender without palpable mass or organomegaly. Well-healed surgical scars were present in the mid epigastrium, at the umbilicus and across the suprapubic area. A moderate degree of midline diastasis was present extending from the xiphoid to several centimeters below the umbilicus but no evidence of recurrent hernia was seen.\par \par I reassured Ms. Pemberton as to the benign nature of the diastasis and suggested that she carry on activities ad allie. I also explained that her recent weight gain has likely contributed to the prominence of the diastasis and also likely accounts for her recent upper abdominal discomfort. Thanks very much for allowing me to see this pleasant woman and if I can be of any additional help, please feel free to call. [FreeTextEntry3] : \par \par Santy Pennington M.D., F.A.C.S.

## 2020-08-14 NOTE — PHYSICAL EXAM
[Normal Breath Sounds] : Normal breath sounds [Normal Heart Sounds] : normal heart sounds [Normal Rate and Rhythm] : normal rate and rhythm [No Rash or Lesion] : No rash or lesion [Alert] : alert [Oriented to Person] : oriented to person [Oriented to Place] : oriented to place [Oriented to Time] : oriented to time [Calm] : calm [de-identified] : Well nourished female, in no apparent distress [JVD] : no jugular venous distention  [de-identified] : WNL [de-identified] : Overweight, soft, nondistended, nontender. No palpable mass or organomegaly. Well-healed surgical scars in the midepigastrium, at the umbilicus and across the suprapubic area. An area of patient's concern, moderate degree of upper midline diastasis which extends slightly below the umbilicus as well, but no evidence of recurrent hernia. [de-identified] : Full ROM

## 2020-08-14 NOTE — PLAN
[FreeTextEntry1] : Patient reassured and advised to carry on activities ad allie. Also suggested attempt at weight loss.

## 2020-08-14 NOTE — ASSESSMENT
[FreeTextEntry1] : 70-year-old white female status post previous umbilical and epigastric hernia repairs now with moderate upper midline diastasis but no evidence of recurrent.

## 2020-08-14 NOTE — HISTORY OF PRESENT ILLNESS
[de-identified] : Leyla is a 71 y/o female here for evaluation of abdominal wall bulge.  [de-identified] : Status post previous mesh repairs of umbilical and subsequent epigastric hernia. Over the past year patient has noted diffuse bulge in the upper midline area of the abdomen which is most apparent while sitting up. Patient also notes some mild discomfort across the upper abdomen especially when sitting for prolonged periods of time. Tolerating diet well and having regular bowel activity.

## 2020-08-19 ENCOUNTER — APPOINTMENT (OUTPATIENT)
Dept: ORTHOPEDIC SURGERY | Facility: CLINIC | Age: 70
End: 2020-08-19
Payer: MEDICARE

## 2020-08-19 VITALS
HEART RATE: 81 BPM | BODY MASS INDEX: 37.89 KG/M2 | SYSTOLIC BLOOD PRESSURE: 117 MMHG | HEIGHT: 68 IN | WEIGHT: 250 LBS | DIASTOLIC BLOOD PRESSURE: 79 MMHG

## 2020-08-19 PROCEDURE — 99215 OFFICE O/P EST HI 40 MIN: CPT

## 2020-08-19 PROCEDURE — 73564 X-RAY EXAM KNEE 4 OR MORE: CPT | Mod: RT

## 2020-08-19 RX ORDER — CELECOXIB 50 MG/1
CAPSULE ORAL
Refills: 0 | Status: ACTIVE | COMMUNITY

## 2020-08-19 RX ORDER — CHROMIUM 200 MCG
TABLET ORAL
Refills: 0 | Status: ACTIVE | COMMUNITY

## 2020-08-19 RX ORDER — OMEPRAZOLE 40 MG/1
40 CAPSULE, DELAYED RELEASE ORAL DAILY
Qty: 90 | Refills: 3 | Status: DISCONTINUED | COMMUNITY
Start: 2019-03-06 | End: 2020-08-19

## 2020-08-24 ENCOUNTER — APPOINTMENT (OUTPATIENT)
Dept: ORTHOPEDIC SURGERY | Facility: CLINIC | Age: 70
End: 2020-08-24
Payer: OTHER MISCELLANEOUS

## 2020-08-24 VITALS
WEIGHT: 250 LBS | BODY MASS INDEX: 37.89 KG/M2 | SYSTOLIC BLOOD PRESSURE: 103 MMHG | HEIGHT: 68 IN | TEMPERATURE: 98.1 F | DIASTOLIC BLOOD PRESSURE: 64 MMHG | HEART RATE: 83 BPM

## 2020-08-24 DIAGNOSIS — M19.111 POST-TRAUMATIC OSTEOARTHRITIS, RIGHT SHOULDER: ICD-10-CM

## 2020-08-24 DIAGNOSIS — Z98.890 OTHER SPECIFIED POSTPROCEDURAL STATES: ICD-10-CM

## 2020-08-24 PROCEDURE — 20610 DRAIN/INJ JOINT/BURSA W/O US: CPT | Mod: RT

## 2020-08-24 PROCEDURE — 99215 OFFICE O/P EST HI 40 MIN: CPT | Mod: 25

## 2020-08-24 PROCEDURE — 73030 X-RAY EXAM OF SHOULDER: CPT | Mod: RT

## 2020-08-24 RX ORDER — LIDOCAINE HYDROCHLORIDE 10 MG/ML
1 INJECTION, SOLUTION INFILTRATION; PERINEURAL
Refills: 0 | Status: COMPLETED | OUTPATIENT
Start: 2020-08-24

## 2020-08-24 RX ORDER — METHYLPRED ACET/NACL,ISO-OS/PF 40 MG/ML
40 VIAL (ML) INJECTION
Qty: 1 | Refills: 0 | Status: COMPLETED | OUTPATIENT
Start: 2020-08-24

## 2020-08-24 RX ADMIN — Medication MG/ML: at 00:00

## 2020-08-24 RX ADMIN — Medication %: at 00:00

## 2020-08-25 PROBLEM — M19.111 POST-TRAUMATIC OSTEOARTHRITIS OF RIGHT SHOULDER: Status: ACTIVE | Noted: 2020-08-24

## 2020-09-02 ENCOUNTER — APPOINTMENT (OUTPATIENT)
Dept: MRI IMAGING | Facility: CLINIC | Age: 70
End: 2020-09-02

## 2020-09-30 ENCOUNTER — APPOINTMENT (OUTPATIENT)
Dept: ORTHOPEDIC SURGERY | Facility: CLINIC | Age: 70
End: 2020-09-30
Payer: MEDICARE

## 2020-09-30 VITALS
HEIGHT: 68 IN | HEART RATE: 96 BPM | TEMPERATURE: 98.2 F | BODY MASS INDEX: 37.89 KG/M2 | SYSTOLIC BLOOD PRESSURE: 138 MMHG | WEIGHT: 250 LBS | DIASTOLIC BLOOD PRESSURE: 85 MMHG

## 2020-09-30 DIAGNOSIS — M17.11 UNILATERAL PRIMARY OSTEOARTHRITIS, RIGHT KNEE: ICD-10-CM

## 2020-09-30 PROCEDURE — 99213 OFFICE O/P EST LOW 20 MIN: CPT

## 2021-08-23 ENCOUNTER — NON-APPOINTMENT (OUTPATIENT)
Age: 71
End: 2021-08-23

## 2021-08-23 ENCOUNTER — APPOINTMENT (OUTPATIENT)
Dept: ORTHOPEDIC SURGERY | Facility: CLINIC | Age: 71
End: 2021-08-23
Payer: OTHER MISCELLANEOUS

## 2021-08-23 VITALS — SYSTOLIC BLOOD PRESSURE: 104 MMHG | HEART RATE: 91 BPM | DIASTOLIC BLOOD PRESSURE: 67 MMHG

## 2021-08-23 PROCEDURE — 99214 OFFICE O/P EST MOD 30 MIN: CPT

## 2021-08-23 PROCEDURE — 73564 X-RAY EXAM KNEE 4 OR MORE: CPT | Mod: LT

## 2021-08-23 PROCEDURE — 99072 ADDL SUPL MATRL&STAF TM PHE: CPT

## 2021-08-23 RX ORDER — FOLIC ACID 20 MG
CAPSULE ORAL
Refills: 0 | Status: ACTIVE | COMMUNITY

## 2021-08-23 RX ORDER — B-COMPLEX WITH VITAMIN C
TABLET ORAL
Refills: 0 | Status: ACTIVE | COMMUNITY

## 2021-08-23 RX ORDER — MAGNESIUM OXIDE/MAG AA CHELATE 300 MG
CAPSULE ORAL
Refills: 0 | Status: ACTIVE | COMMUNITY

## 2021-08-23 NOTE — REASON FOR VISIT
[Follow-Up Visit] : a follow-up visit for [Knee Pain] : knee pain [FreeTextEntry2] : Left knee pain 9/10 3 days

## 2021-08-23 NOTE — PHYSICAL EXAM
[de-identified] : The patient appears well nourished  and in no apparent distress.  The patient is alert and oriented to person, place, and time.   Affect and mood appear normal.    The head is normocephalic and atraumatic.  The eyes reveal normal sclera and extra ocular muscles are intact.   The neck appears normal with no jugular venous distention or masses noted.   Skin shows normal turgor with no evidence of eczema or psoriasis.  No respiratory distress noted.  The patient ambulates with antalgic gait and a single axis cane.\par \par The left knee has decreased range of motion from 0 to 115 degrees with guarding on terminal flexion extension.  Pain noted with range of motion.  Tenderness is noted over the medial joint.    There is a negative Jalen sign. There is no effusion. There is no soft tissue swelling, warmth, or erythema.   There is a negative Lachman sign.  There is no instability to varus/valgus stress or anterior/posterior drawer.  There is normal strength in the in the quadriceps and hamstring muscles.  Sensation is intact to the lower estremity. There are normal pulses distally and good capillary refill. No edema or lymphadenopathy noted.   [de-identified] : AP, lateral, tunnel, and merchant views of the left knee were obtained.  There is mild to moderate patellofemoral narrowing.  The alignment of the knee is normal.  No fractures or dislocations are noted.

## 2021-08-23 NOTE — DISCUSSION/SUMMARY
[de-identified] : This patient presents for initial consultation regarding left knee pain ongoing for about 2 days.  She states that she felt a pop in the knee while ambulating the other day.  This is likely due to altered gait from her low back Worker's Compensation injury and her using a cane.  Due to the patient's significant pain and stiffness as well as meniscal signs and physical exam I recommended an MRI to rule out medial meniscal tear.  Patient will obtain the MRI and see his back in the office.  In the meantime she will reduce activities and avoid excessive ambulation and stair climbing.  She can use over-the-counter anti-inflammatories and ice as well.  She will continue using the cane.
after hours

## 2021-08-23 NOTE — HISTORY OF PRESENT ILLNESS
[de-identified] : This patient presents for evaluation regarding injury sustained to the left knee.  She has issues with her low back due to a Worker's Compensation injury and was using a cane recently.  This is altering her gait and then she felt a pop in the left knee while ambulating the other day.  This gave her severe medial joint pain.  She is also having pain posteriorly.  She notes stiffness about the knee.  Pain is 2 out of 10 at rest and 9 out of 10 with activity.  Patient been taking Celebrex for other orthopedic issues.  Insert history of general

## 2021-09-08 ENCOUNTER — APPOINTMENT (OUTPATIENT)
Dept: MRI IMAGING | Facility: CLINIC | Age: 71
End: 2021-09-08
Payer: MEDICARE

## 2021-09-08 ENCOUNTER — OUTPATIENT (OUTPATIENT)
Dept: OUTPATIENT SERVICES | Facility: HOSPITAL | Age: 71
LOS: 1 days | End: 2021-09-08
Payer: COMMERCIAL

## 2021-09-08 DIAGNOSIS — M23.92 UNSPECIFIED INTERNAL DERANGEMENT OF LEFT KNEE: ICD-10-CM

## 2021-09-08 PROCEDURE — 73721 MRI JNT OF LWR EXTRE W/O DYE: CPT

## 2021-09-08 PROCEDURE — 73721 MRI JNT OF LWR EXTRE W/O DYE: CPT | Mod: 26,LT

## 2021-09-15 ENCOUNTER — APPOINTMENT (OUTPATIENT)
Dept: ORTHOPEDIC SURGERY | Facility: CLINIC | Age: 71
End: 2021-09-15
Payer: MEDICARE

## 2021-09-15 VITALS — BODY MASS INDEX: 37.89 KG/M2 | HEIGHT: 68 IN | WEIGHT: 250 LBS

## 2021-09-15 DIAGNOSIS — S83.289A OTHER TEAR OF LATERAL MENISCUS, CURRENT INJURY, UNSPECIFIED KNEE, INITIAL ENCOUNTER: ICD-10-CM

## 2021-09-15 PROCEDURE — 99072 ADDL SUPL MATRL&STAF TM PHE: CPT

## 2021-09-15 PROCEDURE — 99214 OFFICE O/P EST MOD 30 MIN: CPT

## 2021-10-13 ENCOUNTER — APPOINTMENT (OUTPATIENT)
Dept: ORTHOPEDIC SURGERY | Facility: CLINIC | Age: 71
End: 2021-10-13
Payer: MEDICARE

## 2021-10-13 VITALS — WEIGHT: 250 LBS | HEIGHT: 68 IN | BODY MASS INDEX: 37.89 KG/M2

## 2021-10-13 PROCEDURE — 99213 OFFICE O/P EST LOW 20 MIN: CPT

## 2021-10-13 PROCEDURE — 99072 ADDL SUPL MATRL&STAF TM PHE: CPT

## 2021-11-16 ENCOUNTER — APPOINTMENT (OUTPATIENT)
Dept: ORTHOPEDIC SURGERY | Facility: CLINIC | Age: 71
End: 2021-11-16
Payer: OTHER MISCELLANEOUS

## 2021-11-16 VITALS — HEART RATE: 79 BPM | SYSTOLIC BLOOD PRESSURE: 143 MMHG | DIASTOLIC BLOOD PRESSURE: 86 MMHG

## 2021-11-16 DIAGNOSIS — S83.289A OTHER TEAR OF LATERAL MENISCUS, CURRENT INJURY, UNSPECIFIED KNEE, INITIAL ENCOUNTER: ICD-10-CM

## 2021-11-16 PROCEDURE — 99072 ADDL SUPL MATRL&STAF TM PHE: CPT

## 2021-11-16 PROCEDURE — 99213 OFFICE O/P EST LOW 20 MIN: CPT

## 2021-11-22 ENCOUNTER — APPOINTMENT (OUTPATIENT)
Dept: PHARMACY | Facility: CLINIC | Age: 71
End: 2021-11-22

## 2021-11-22 PROBLEM — S83.289A LATERAL MENISCAL TEAR: Status: ACTIVE | Noted: 2021-09-15

## 2021-12-15 ENCOUNTER — APPOINTMENT (OUTPATIENT)
Dept: ORTHOPEDIC SURGERY | Facility: CLINIC | Age: 71
End: 2021-12-15
Payer: MEDICARE

## 2021-12-15 VITALS
SYSTOLIC BLOOD PRESSURE: 115 MMHG | BODY MASS INDEX: 36.53 KG/M2 | HEIGHT: 68 IN | HEART RATE: 80 BPM | WEIGHT: 241 LBS | DIASTOLIC BLOOD PRESSURE: 72 MMHG

## 2021-12-15 PROCEDURE — 99213 OFFICE O/P EST LOW 20 MIN: CPT

## 2021-12-15 PROCEDURE — 99072 ADDL SUPL MATRL&STAF TM PHE: CPT

## 2022-01-26 ENCOUNTER — APPOINTMENT (OUTPATIENT)
Dept: ORTHOPEDIC SURGERY | Facility: CLINIC | Age: 72
End: 2022-01-26
Payer: MEDICARE

## 2022-01-26 PROCEDURE — 99213 OFFICE O/P EST LOW 20 MIN: CPT

## 2022-01-26 PROCEDURE — 99072 ADDL SUPL MATRL&STAF TM PHE: CPT

## 2022-01-26 RX ORDER — MELOXICAM 15 MG/1
15 TABLET ORAL
Qty: 30 | Refills: 0 | Status: DISCONTINUED | COMMUNITY
Start: 2021-09-15 | End: 2022-01-26

## 2022-01-26 RX ORDER — PREDNISONE 50 MG/1
TABLET ORAL
Refills: 0 | Status: ACTIVE | COMMUNITY

## 2022-03-09 ENCOUNTER — APPOINTMENT (OUTPATIENT)
Dept: ORTHOPEDIC SURGERY | Facility: CLINIC | Age: 72
End: 2022-03-09
Payer: MEDICARE

## 2022-03-09 VITALS — SYSTOLIC BLOOD PRESSURE: 133 MMHG | DIASTOLIC BLOOD PRESSURE: 90 MMHG | HEART RATE: 76 BPM

## 2022-03-09 PROCEDURE — 99213 OFFICE O/P EST LOW 20 MIN: CPT

## 2022-03-09 PROCEDURE — 99072 ADDL SUPL MATRL&STAF TM PHE: CPT

## 2022-03-09 RX ORDER — CELECOXIB 200 MG/1
200 CAPSULE ORAL DAILY
Qty: 30 | Refills: 0 | Status: ACTIVE | COMMUNITY
Start: 2022-03-09 | End: 1900-01-01

## 2022-04-10 ENCOUNTER — NON-APPOINTMENT (OUTPATIENT)
Age: 72
End: 2022-04-10

## 2022-04-20 ENCOUNTER — APPOINTMENT (OUTPATIENT)
Dept: ORTHOPEDIC SURGERY | Facility: CLINIC | Age: 72
End: 2022-04-20

## 2022-04-20 ENCOUNTER — APPOINTMENT (OUTPATIENT)
Dept: ORTHOPEDIC SURGERY | Facility: CLINIC | Age: 72
End: 2022-04-20
Payer: MEDICARE

## 2022-04-20 VITALS — HEART RATE: 76 BPM | DIASTOLIC BLOOD PRESSURE: 79 MMHG | SYSTOLIC BLOOD PRESSURE: 117 MMHG

## 2022-04-20 PROCEDURE — 99072 ADDL SUPL MATRL&STAF TM PHE: CPT

## 2022-04-20 PROCEDURE — 99213 OFFICE O/P EST LOW 20 MIN: CPT

## 2022-06-01 ENCOUNTER — APPOINTMENT (OUTPATIENT)
Dept: ORTHOPEDIC SURGERY | Facility: CLINIC | Age: 72
End: 2022-06-01
Payer: MEDICARE

## 2022-06-01 VITALS — SYSTOLIC BLOOD PRESSURE: 128 MMHG | DIASTOLIC BLOOD PRESSURE: 80 MMHG | HEART RATE: 73 BPM

## 2022-06-01 PROCEDURE — 99213 OFFICE O/P EST LOW 20 MIN: CPT

## 2022-06-01 PROCEDURE — 99072 ADDL SUPL MATRL&STAF TM PHE: CPT

## 2022-06-01 RX ORDER — CELECOXIB 200 MG/1
200 CAPSULE ORAL DAILY
Qty: 30 | Refills: 0 | Status: ACTIVE | COMMUNITY
Start: 2022-06-01 | End: 1900-01-01

## 2022-06-01 RX ORDER — BENZONATATE 100 MG/1
100 CAPSULE ORAL
Qty: 45 | Refills: 0 | Status: ACTIVE | COMMUNITY
Start: 2022-05-18

## 2022-06-01 RX ORDER — MELOXICAM 15 MG/1
15 TABLET ORAL
Qty: 30 | Refills: 0 | Status: DISCONTINUED | COMMUNITY
Start: 2021-12-15 | End: 2022-06-01

## 2022-06-01 RX ORDER — MELOXICAM 15 MG/1
15 TABLET ORAL
Qty: 30 | Refills: 0 | Status: DISCONTINUED | COMMUNITY
Start: 2021-10-13 | End: 2022-06-01

## 2022-09-06 ENCOUNTER — APPOINTMENT (OUTPATIENT)
Dept: ORTHOPEDIC SURGERY | Facility: CLINIC | Age: 72
End: 2022-09-06

## 2022-09-06 VITALS — HEART RATE: 80 BPM | SYSTOLIC BLOOD PRESSURE: 115 MMHG | DIASTOLIC BLOOD PRESSURE: 77 MMHG

## 2022-09-06 PROCEDURE — 99213 OFFICE O/P EST LOW 20 MIN: CPT

## 2022-09-06 PROCEDURE — 99072 ADDL SUPL MATRL&STAF TM PHE: CPT

## 2022-09-06 RX ORDER — DICLOFENAC SODIUM 75 MG/1
75 TABLET, DELAYED RELEASE ORAL
Qty: 1 | Refills: 0 | Status: ACTIVE | COMMUNITY
Start: 2022-09-06 | End: 1900-01-01

## 2022-09-06 NOTE — REASON FOR VISIT
[Workers' Comp: Date of Injury: _______] : This visit is related to worker's compensation. Date of Injury: [unfilled] [FreeTextEntry2] : Left knee internal derangement

## 2022-09-06 NOTE — PLAN
[FreeTextEntry1] : Patient presents today for follow-up regarding Worker's Compensation injury to the left knee.  Patient cannot have a hearing in the next month or so regarding authorization for arthroscopic surgery for the left knee.  Patient will continue current activities as tolerated.  I like to try to switch her from the Celebrex to diclofenac as she seems to feel that the Celebrex is not working as well for her anymore.  We will call in a prescription to the pharmacy for diclofenac.  She will discontinue the Celebrex.  She will see us back in 1 month for follow-up.

## 2022-09-06 NOTE — PHYSICAL EXAM
[de-identified] : The patient appears well nourished  and in no apparent distress.  The patient is alert and oriented to person, place, and time.   Affect and mood appear normal.    The head is normocephalic and atraumatic.  The eyes reveal normal sclera and extra ocular muscles are intact.   The neck appears normal with no jugular venous distention or masses noted.   Skin shows normal turgor with no evidence of eczema or psoriasis.  No respiratory distress noted.  The patient ambulates with antalgic gait and a single axis cane.\par \par The left knee has decreased range of motion from 0 to 115 degrees with guarding on terminal flexion extension.  Pain noted with range of motion.  Tenderness is noted over the medial joint.    There is a negative Jalen sign. There is no effusion. There is no soft tissue swelling, warmth, or erythema.   There is a negative Lachman sign.  There is no instability to varus/valgus stress or anterior/posterior drawer.  There is normal strength in the in the quadriceps and hamstring muscles.  Sensation is intact to the lower estremity. There are normal pulses distally and good capillary refill. No edema or lymphadenopathy noted.

## 2022-09-06 NOTE — HISTORY OF PRESENT ILLNESS
[No] : The patient is currently not working. [Worsening] : worsening [6] : a current pain level of 6/10 [FreeTextEntry1] : This patient presents for follow-up regarding Worker's Compensation injury to the left knee.  She is been treated for internal derangement and medial meniscal tearing.  We are awaiting authorization for surgery for the knee.  She does have a hearing scheduled within the next month.  She notes continued pain and buckling about the knee.  She notes problems climbing stairs and ambulating for any significant distances.  Her pain level 6 out of 10.  She takes Celebrex and ibuprofen intermittently for the pain.

## 2022-10-19 RX ORDER — SODIUM CHLORIDE 9 MG/ML
500 INJECTION INTRAMUSCULAR; INTRAVENOUS; SUBCUTANEOUS
Refills: 0 | Status: DISCONTINUED | OUTPATIENT
Start: 2022-10-20 | End: 2022-11-03

## 2022-10-19 NOTE — PRE PROCEDURE NOTE - PRE PROCEDURE EVALUATION
Attending Physician:       Woody Moran                     Procedure: EGD/colonoscopy    Indication for Procedure: Dysphagia, colon screening  ________________________________________________________  PAST MEDICAL & SURGICAL HISTORY:    Cardiac Arrhythmia (PSVT)  Cardiac Ablation 2003    HTN  Severe JASON on CPAP  History of uterine cancer (hysterectomy and radiation)  GERD  Hx of colon polyps  Cholelithiasis      Hernia (ICD9 553.9)  Abdominal/umbilical  hernia repair&#x27;s x3    2000, 2002, 2005      Obesity (ICD9 278.00)      Hypothyroidism (ICD9 244.9)      Cardiac Ablation  2003      Hernia (ICD9 553.9)  Abdoninal/ Umbilical Hernia repairs x3   2000, 2002, 2005  Appendectomy      Elbow Tendonitis (ICD9 726.32)  Rt elbow repair 1990  Left elbow repair 2007      Shoulder Avulsion (ICD9 880.00)  Rt shoulder 2004        ALLERGIES:  dust (Rhinitis)  glucosamine (Unknown)  Mold:  sneezing, coughing (Rhinitis)  perfume (Headache)    HOME MEDICATIONS: Biotin, calcium/vit D3, celecoxib, famotidine, folic acid, levothyroxine, magnesium, metoprolol, prednisone, ranolazine, B complex    AICD/PPM: [ ] yes   [x ] no    PERTINENT LAB DATA: CBC, CMP nl                      PHYSICAL EXAMINATION:    T(C): -- 36.3  HR: --  77  BP: --  120/60  SpO2: -- 97%  BMI: -- 36.17    Constitutional: NAD  Respiratory: CTAB/L,   Cardiovascular: S1 and S2  Gastrointestinal: BS+, soft, epig tenderness, diastasis recti    ASA Class: I [ ]  II [ ]  III [X ]  IV [ ]    COMMENTS:    The patient is a suitable candidate for the planned procedure unless box checked [ ]  No, explain:     minimum assist (75% patients effort)

## 2022-10-20 ENCOUNTER — RESULT REVIEW (OUTPATIENT)
Age: 72
End: 2022-10-20

## 2022-10-20 ENCOUNTER — TRANSCRIPTION ENCOUNTER (OUTPATIENT)
Age: 72
End: 2022-10-20

## 2022-10-20 ENCOUNTER — OUTPATIENT (OUTPATIENT)
Dept: OUTPATIENT SERVICES | Facility: HOSPITAL | Age: 72
LOS: 1 days | End: 2022-10-20
Payer: MEDICARE

## 2022-10-20 VITALS
DIASTOLIC BLOOD PRESSURE: 68 MMHG | OXYGEN SATURATION: 100 % | HEART RATE: 69 BPM | SYSTOLIC BLOOD PRESSURE: 129 MMHG | RESPIRATION RATE: 20 BRPM

## 2022-10-20 VITALS
DIASTOLIC BLOOD PRESSURE: 94 MMHG | WEIGHT: 238.1 LBS | OXYGEN SATURATION: 99 % | RESPIRATION RATE: 20 BRPM | HEART RATE: 92 BPM | HEIGHT: 68.5 IN | SYSTOLIC BLOOD PRESSURE: 138 MMHG | TEMPERATURE: 98 F

## 2022-10-20 DIAGNOSIS — Z86.010 PERSONAL HISTORY OF COLONIC POLYPS: ICD-10-CM

## 2022-10-20 DIAGNOSIS — R13.10 DYSPHAGIA, UNSPECIFIED: ICD-10-CM

## 2022-10-20 PROCEDURE — 43239 EGD BIOPSY SINGLE/MULTIPLE: CPT

## 2022-10-20 PROCEDURE — 88305 TISSUE EXAM BY PATHOLOGIST: CPT

## 2022-10-20 PROCEDURE — 45380 COLONOSCOPY AND BIOPSY: CPT

## 2022-10-20 PROCEDURE — 88305 TISSUE EXAM BY PATHOLOGIST: CPT | Mod: 26

## 2022-10-20 RX ORDER — CHOLECALCIFEROL (VITAMIN D3) 125 MCG
1 CAPSULE ORAL
Qty: 0 | Refills: 0 | DISCHARGE

## 2022-10-20 RX ORDER — FOLIC ACID 0.8 MG
1 TABLET ORAL
Qty: 0 | Refills: 0 | DISCHARGE

## 2022-10-20 RX ORDER — METOPROLOL TARTRATE 50 MG
1 TABLET ORAL
Qty: 0 | Refills: 0 | DISCHARGE

## 2022-10-20 RX ORDER — CELECOXIB 200 MG/1
1 CAPSULE ORAL
Qty: 0 | Refills: 0 | DISCHARGE

## 2022-10-20 RX ORDER — LEVOTHYROXINE SODIUM 125 MCG
1 TABLET ORAL
Qty: 0 | Refills: 0 | DISCHARGE

## 2022-10-20 RX ORDER — FAMOTIDINE 10 MG/ML
1 INJECTION INTRAVENOUS
Qty: 0 | Refills: 0 | DISCHARGE

## 2022-10-20 RX ORDER — OMEPRAZOLE 10 MG/1
1 CAPSULE, DELAYED RELEASE ORAL
Qty: 0 | Refills: 0 | DISCHARGE

## 2022-10-20 RX ORDER — RANOLAZINE 500 MG/1
1 TABLET, FILM COATED, EXTENDED RELEASE ORAL
Qty: 0 | Refills: 0 | DISCHARGE

## 2022-10-20 NOTE — ASU DISCHARGE PLAN (ADULT/PEDIATRIC) - NS MD DC FALL RISK RISK
For information on Fall & Injury Prevention, visit: [email or web address] OR  [email or web address] OR  https://www.cdc.gov/steadi/patient.html

## 2022-10-20 NOTE — ASU PATIENT PROFILE, ADULT - NSICDXPASTSURGICALHX_GEN_ALL_CORE_FT
PAST SURGICAL HISTORY:  Cardiac Ablation 2003    Elbow Tendonitis (ICD9 726.32) Rt elbow repair 1990  Left elbow repair 2007    Hernia (ICD9 553.9) Abdoninal/ Umbilical Hernia repairs x3   2000, 2002, 2005    Shoulder Avulsion (ICD9 880.00) Rt shoulder 2004

## 2022-10-20 NOTE — ASU DISCHARGE PLAN (ADULT/PEDIATRIC) - ASU DC SPECIAL INSTRUCTIONSFT
Office will call with biopsy results in 7-10 days    Office visit in 6 months    Surveillance colonoscopy in 5 years

## 2022-10-20 NOTE — ASU PATIENT PROFILE, ADULT - NSICDXPASTMEDICALHX_GEN_ALL_CORE_FT
PAST MEDICAL HISTORY:  Cardiac Arrhythmia (ICD9 427.9) Cardiac Ablation 2003    Hernia (ICD9 553.9) Abdominal/umbilical  hernia repair's x3    2000, 2002, 2005    Hypothyroidism (ICD9 244.9)     Obesity (ICD9 278.00)     JASON on CPAP

## 2022-10-21 LAB — SURGICAL PATHOLOGY STUDY: SIGNIFICANT CHANGE UP

## 2022-11-07 ENCOUNTER — APPOINTMENT (OUTPATIENT)
Dept: ORTHOPEDIC SURGERY | Facility: CLINIC | Age: 72
End: 2022-11-07

## 2023-02-10 PROBLEM — G47.33 OBSTRUCTIVE SLEEP APNEA (ADULT) (PEDIATRIC): Chronic | Status: ACTIVE | Noted: 2022-10-20

## 2023-02-22 ENCOUNTER — APPOINTMENT (OUTPATIENT)
Dept: ORTHOPEDIC SURGERY | Facility: CLINIC | Age: 73
End: 2023-02-22
Payer: OTHER MISCELLANEOUS

## 2023-02-22 ENCOUNTER — NON-APPOINTMENT (OUTPATIENT)
Age: 73
End: 2023-02-22

## 2023-02-22 VITALS — SYSTOLIC BLOOD PRESSURE: 122 MMHG | HEART RATE: 80 BPM | DIASTOLIC BLOOD PRESSURE: 80 MMHG

## 2023-02-22 DIAGNOSIS — S83.249A OTHER TEAR OF MEDIAL MENISCUS, CURRENT INJURY, UNSPECIFIED KNEE, INITIAL ENCOUNTER: ICD-10-CM

## 2023-02-22 DIAGNOSIS — M23.92 UNSPECIFIED INTERNAL DERANGEMENT OF LEFT KNEE: ICD-10-CM

## 2023-02-22 PROCEDURE — 99072 ADDL SUPL MATRL&STAF TM PHE: CPT

## 2023-02-22 PROCEDURE — 99213 OFFICE O/P EST LOW 20 MIN: CPT

## 2024-03-13 ENCOUNTER — NON-APPOINTMENT (OUTPATIENT)
Age: 74
End: 2024-03-13

## 2024-03-13 ENCOUNTER — APPOINTMENT (OUTPATIENT)
Dept: ORTHOPEDIC SURGERY | Facility: CLINIC | Age: 74
End: 2024-03-13
Payer: OTHER MISCELLANEOUS

## 2024-03-13 VITALS — DIASTOLIC BLOOD PRESSURE: 80 MMHG | SYSTOLIC BLOOD PRESSURE: 116 MMHG | HEART RATE: 73 BPM

## 2024-03-13 PROCEDURE — 99214 OFFICE O/P EST MOD 30 MIN: CPT

## 2024-03-13 PROCEDURE — 73564 X-RAY EXAM KNEE 4 OR MORE: CPT | Mod: LT

## 2024-03-13 NOTE — DISCUSSION/SUMMARY
[de-identified] : This patient presents for follow-up regarding left knee osteoarthritis Worker's Compensation injury.  Patient understands that she does require total knee replacement to alleviate her symptoms.  Despite meniscal tearing on the MRI she does have significant osteoarthritis and this was not an improvement in the arthroscopic intervention.  I discussed the procedure with her at length as well as the risk benefits alternative treatment plans.  She will continue Celebrex as needed and she is here to speak with a  regarding a possible hearing due to Worker's Compensation case.  At least 30 minutes was spent performing the evaluation and management on today's office visit.  This includes but is not limited to preparing to see patient including review of any test results or outside medical records, obtaining and/or reviewing separately obtained history, performing examination and evaluation, counseling and educating the patient on their diagnosis and treatment recommendations, ordering medications, tests, or procedures, documenting clinical information in the electronic health record, independently interpreting results (not separately reported) and communicating results to the patient, and coordination of care.

## 2024-03-13 NOTE — PHYSICAL EXAM
[de-identified] : AP, lateral, tunnel, and merchant views of the left knee were obtained.  There is moderate medial joint narrowing with osteophyte formation.  There is also moderate patellofemoral narrowing with osteophyte formation and patella kayla.  No fractures or dislocations are noted. [de-identified] : The patient appears well nourished  and in no apparent distress.  The patient is alert and oriented to person, place, and time.   Affect and mood appear normal.    The head is normocephalic and atraumatic.  The eyes reveal normal sclera and extra ocular muscles are intact.   The neck appears normal with no jugular venous distention or masses noted.   Skin shows normal turgor with no evidence of eczema or psoriasis.  No respiratory distress noted.  The patient ambulates with antalgic gait and a single axis cane.  The left knee has decreased range of motion from 0 to 115 degrees with guarding on terminal flexion extension.  Pain noted with range of motion.  Tenderness is noted over the medial joint.    There is a negative [facility] sign. There is no effusion. There is no soft tissue swelling, warmth, or erythema.   There is a negative Lachman sign.  There is no instability to varus/valgus stress or anterior/posterior drawer.  There is normal strength in the in the quadriceps and hamstring muscles.  Sensation is intact to the lower estremity. There are normal pulses distally and good capillary refill. No edema or lymphadenopathy noted.

## 2024-03-13 NOTE — ASSESSMENT
[Indicate if the patient's complaints are consistent with his/her history of the injury/illness.] : Indicate if the patient's complaints are consistent with his/her history of the injury/illness: Yes [Indicate if, in your opinion, the incident that the patient described was the competent medical cause of this injury/illness.] : The incident that the patient described was the competent medical cause of this injury/illness: Yes [Can the patient return to usual work activities as indicated? If yes, indicate date___] : The patient cannot return to usual work activities as indicated. [Yes] : Yes, it is consistent

## 2024-03-13 NOTE — HISTORY OF PRESENT ILLNESS
[FreeTextEntry1] : This patient presents today for follow-up regarding Worker's Compensation injury to the left knee.  Treating her for left knee injury with osteoarthritis.  Pain level 5 out of 10.  She takes Celebrex intermittently which seems to help with the pain.  However she continues to complain of significant pain about the medial aspect of the knee joint.  She has noted to have significant meniscal tearing and degenerative changes by MRI.  She also notes that the knee locks up at times and she is having problems ambulating climbing up the stairs. [No] : The patient is currently not working. [Has the patient missed work because of the injury/illness?] : The patient has missed work because of the injury/illness.

## 2024-04-09 ENCOUNTER — APPOINTMENT (OUTPATIENT)
Dept: ORTHOPEDIC SURGERY | Facility: CLINIC | Age: 74
End: 2024-04-09
Payer: OTHER MISCELLANEOUS

## 2024-04-09 VITALS
HEART RATE: 75 BPM | HEIGHT: 68 IN | WEIGHT: 252 LBS | SYSTOLIC BLOOD PRESSURE: 132 MMHG | DIASTOLIC BLOOD PRESSURE: 84 MMHG | BODY MASS INDEX: 38.19 KG/M2

## 2024-04-09 DIAGNOSIS — M17.12 UNILATERAL PRIMARY OSTEOARTHRITIS, LEFT KNEE: ICD-10-CM

## 2024-04-09 PROCEDURE — 99214 OFFICE O/P EST MOD 30 MIN: CPT

## 2024-04-09 RX ORDER — CELECOXIB 200 MG/1
200 CAPSULE ORAL DAILY
Qty: 30 | Refills: 0 | Status: ACTIVE | COMMUNITY
Start: 2024-04-09 | End: 1900-01-01

## 2024-04-09 NOTE — ASSESSMENT
[Indicate if, in your opinion, the incident that the patient described was the competent medical cause of this injury/illness.] : The incident that the patient described was the competent medical cause of this injury/illness: Yes [Indicate if the patient's complaints are consistent with his/her history of the injury/illness.] : Indicate if the patient's complaints are consistent with his/her history of the injury/illness: Yes [Yes] : Yes, it is consistent [Can the patient return to usual work activities as indicated? If yes, indicate date___] : The patient cannot return to usual work activities as indicated.

## 2024-04-09 NOTE — DISCUSSION/SUMMARY
[de-identified] : This patient presents for follow-up regarding left knee osteoarthritis Worker's Compensation injury.  Patient understands that she does require total knee replacement to alleviate her symptoms.  Despite meniscal tearing on the MRI she does have significant osteoarthritis and this was not an improvement in the arthroscopic intervention.  I discussed the procedure with her at length as well as the risk benefits alternative treatment plans.  She will continue Celebrex as needed and we did call in a renewal of her Celebrex prescription.  She is scheduled to have a hearing regarding including the left knee into a Worker's Compensation case.  We eagerly await the results of that hearing.  At least 30 minutes was spent performing the evaluation and management on today's office visit.  This includes but is not limited to preparing to see patient including review of any test results or outside medical records, obtaining and/or reviewing separately obtained history, performing examination and evaluation, counseling and educating the patient on their diagnosis and treatment recommendations, ordering medications, tests, or procedures, documenting clinical information in the electronic health record, independently interpreting results (not separately reported) and communicating results to the patient, and coordination of care.

## 2024-04-09 NOTE — PHYSICAL EXAM
[de-identified] : The patient appears well nourished  and in no apparent distress.  The patient is alert and oriented to person, place, and time.   Affect and mood appear normal.    The head is normocephalic and atraumatic.  The eyes reveal normal sclera and extra ocular muscles are intact.   The neck appears normal with no jugular venous distention or masses noted.   Skin shows normal turgor with no evidence of eczema or psoriasis.  No respiratory distress noted.  The patient ambulates with antalgic gait and a single axis cane.  The left knee has decreased range of motion from 0 to 115 degrees with guarding on terminal flexion extension.  Pain noted with range of motion.  Tenderness is noted over the medial joint.    There is a negative [facility] sign. There is no effusion. There is no soft tissue swelling, warmth, or erythema.   There is a negative Lachman sign.  There is no instability to varus/valgus stress or anterior/posterior drawer.  There is normal strength in the in the quadriceps and hamstring muscles.  Sensation is intact to the lower estremity. There are normal pulses distally and good capillary refill. No edema or lymphadenopathy noted.

## 2024-04-09 NOTE — HISTORY OF PRESENT ILLNESS
[FreeTextEntry1] : This patient presents for follow-up regarding osteoarthritis about the left knee.  Pain level continues to be 7-8 out of 10.  Pain worse with activity such as ambulation and stair climbing and getting up from a seated position.  Pain is improved with rest.  She notes locking and mechanical symptoms about the left knee as well.  She is taking Celebrex and she is requesting a refill.  She is scheduled to have a hearing regarding including the left knee into her Worker's Compensation case.  We eagerly await the decision from the . [Has the patient missed work because of the injury/illness?] : The patient has missed work because of the injury/illness. [No] : The patient is currently not working. [de-identified] : celebrex

## 2024-06-24 RX ORDER — CELECOXIB 200 MG/1
200 CAPSULE ORAL
Qty: 30 | Refills: 0 | Status: ACTIVE | COMMUNITY
Start: 2024-06-24 | End: 1900-01-01

## 2024-12-17 ENCOUNTER — APPOINTMENT (OUTPATIENT)
Dept: ORTHOPEDIC SURGERY | Facility: CLINIC | Age: 74
End: 2024-12-17
Payer: MEDICARE

## 2024-12-17 VITALS — BODY MASS INDEX: 34.61 KG/M2 | HEIGHT: 68.5 IN | WEIGHT: 231 LBS

## 2024-12-17 DIAGNOSIS — G56.01 CARPAL TUNNEL SYNDROME, RIGHT UPPER LIMB: ICD-10-CM

## 2024-12-17 DIAGNOSIS — G56.02 CARPAL TUNNEL SYNDROME, LEFT UPPER LIMB: ICD-10-CM

## 2024-12-17 DIAGNOSIS — Z78.9 OTHER SPECIFIED HEALTH STATUS: ICD-10-CM

## 2024-12-17 DIAGNOSIS — M67.441 GANGLION, RIGHT HAND: ICD-10-CM

## 2024-12-17 DIAGNOSIS — M18.11 UNILATERAL PRIMARY OSTEOARTHRITIS OF FIRST CARPOMETACARPAL JOINT, RIGHT HAND: ICD-10-CM

## 2024-12-17 DIAGNOSIS — M65.312 TRIGGER THUMB, LEFT THUMB: ICD-10-CM

## 2024-12-17 DIAGNOSIS — M19.041 PRIMARY OSTEOARTHRITIS, RIGHT HAND: ICD-10-CM

## 2024-12-17 DIAGNOSIS — M19.042 PRIMARY OSTEOARTHRITIS, LEFT HAND: ICD-10-CM

## 2024-12-17 DIAGNOSIS — M18.12 UNILATERAL PRIMARY OSTEOARTHRITIS OF FIRST CARPOMETACARPAL JOINT, LEFT HAND: ICD-10-CM

## 2024-12-17 PROCEDURE — 20550 NJX 1 TENDON SHEATH/LIGAMENT: CPT | Mod: LT

## 2024-12-17 PROCEDURE — 73110 X-RAY EXAM OF WRIST: CPT | Mod: LT

## 2024-12-17 PROCEDURE — 99203 OFFICE O/P NEW LOW 30 MIN: CPT | Mod: 25

## 2024-12-17 RX ORDER — DICLOFENAC SODIUM 10 MG/G
1 GEL TOPICAL
Qty: 1 | Refills: 3 | Status: ACTIVE | COMMUNITY
Start: 2024-12-17 | End: 1900-01-01

## (undated) DEVICE — IRRIGATOR BIO SHIELD

## (undated) DEVICE — SYR LUER LOK 50CC

## (undated) DEVICE — POLY TRAP ETRAP

## (undated) DEVICE — FOLEY HOLDER STATLOCK 2 WAY ADULT

## (undated) DEVICE — CATH IV SAFE BC 22G X 1" (BLUE)

## (undated) DEVICE — BIOPSY FORCEP RADIAL JAW 4 STANDARD WITH NEEDLE

## (undated) DEVICE — FORCEP RADIAL JAW 4 JUMBO 2.8MM 3.2MM 240CM ORANGE DISP

## (undated) DEVICE — SOL INJ NS 0.9% 500ML 2 PORT

## (undated) DEVICE — SENSOR O2 FINGER ADULT

## (undated) DEVICE — ELCTR GROUNDING PAD ADULT COVIDIEN

## (undated) DEVICE — TUBING SUCTION CONN 6FT STERILE

## (undated) DEVICE — BITE BLOCK ADULT 20 X 27MM (GREEN)

## (undated) DEVICE — BRUSH COLONOSCOPY CYTOLOGY

## (undated) DEVICE — TUBING IV SET GRAVITY 3Y 100" MACRO

## (undated) DEVICE — SUCTION YANKAUER NO CONTROL VENT

## (undated) DEVICE — SYR ALLIANCE II INFLATION 60ML

## (undated) DEVICE — PACK IV START WITH CHG

## (undated) DEVICE — CATH IV SAFE BC 20G X 1.16" (PINK)

## (undated) DEVICE — CLAMP BX HOT RAD JAW 3

## (undated) DEVICE — TUBING SUCTION 20FT

## (undated) DEVICE — BALLOON US ENDO